# Patient Record
Sex: MALE | Race: WHITE | NOT HISPANIC OR LATINO | Employment: UNEMPLOYED | URBAN - METROPOLITAN AREA
[De-identification: names, ages, dates, MRNs, and addresses within clinical notes are randomized per-mention and may not be internally consistent; named-entity substitution may affect disease eponyms.]

---
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
## 2024-03-08 ENCOUNTER — OFFICE VISIT (OUTPATIENT)
Dept: URGENT CARE | Age: 19
End: 2024-03-08
Payer: COMMERCIAL

## 2024-03-08 VITALS — HEART RATE: 134 BPM | OXYGEN SATURATION: 99 % | TEMPERATURE: 97.5 F | RESPIRATION RATE: 18 BRPM

## 2024-03-08 DIAGNOSIS — L98.9 SKIN LESION, SUPERFICIAL: Primary | ICD-10-CM

## 2024-03-08 PROCEDURE — 99213 OFFICE O/P EST LOW 20 MIN: CPT | Performed by: PHYSICIAN ASSISTANT

## 2024-03-08 NOTE — PROGRESS NOTES
[facility]        NAME: Ishaan Sykes is a 19 y.o. male  : 2005    MRN: [id number]  DATE: 2024  TIME: 9:36 AM    Assessment and Plan   Skin lesion, superficial [L98.9]  1. Skin lesion, superficial              Patient Instructions       Follow up with PCP in 3-5 days.  Proceed to  ER if symptoms worsen.    If tests have been performed at [facility], our office will contact you with results if changes need to be made to the care plan discussed with you at the visit.  You can [facility] on [facility].    Chief Complaint     Chief Complaint   Patient presents with    Rash     Patient states that he noticed Tuesday that he has a small nodule on the penis. He denies pain with urination. He is very anxious at this time and is unsure where he got it. He believes it is an STI.          History of Present Illness       Patient here for evaluation of a skin lesion he noticed on the shaft of the penis on Tuesday.  Patient was sexually active  night into Monday.  He states he only has 1 partner with no known infections or lesions.  Patient is a risk a little bit of fluid in it and he did scratch it but denies any pain, residual swelling.  He states he noticed possible smaller lesion on the underside as well.    Rash        Review of Systems   Review of Systems   Constitutional: Negative.    Genitourinary:  Positive for genital sores. Negative for decreased urine volume, difficulty urinating, dysuria, enuresis, frequency, hematuria, penile discharge, penile pain, penile swelling, scrotal swelling, testicular pain and urgency.   Musculoskeletal: Negative.    Skin:  Positive for rash.         Current Medications       Current Outpatient Medications:     albuterol (PROVENTIL HFA,VENTOLIN HFA) 90 mcg/act inhaler, Inhale (Patient not taking: Reported on 2021 ), Disp: , Rfl:     dicyclomine (BENTYL) 10 mg capsule, Take 1 capsule (10 mg total) by mouth 4 (four) times a day (before  meals and at bedtime) for 5 days, Disp: 20 capsule, Rfl: 0    hydrocortisone 1 % cream, Apply topically 4 (four) times a day as needed for irritation (Patient not taking: Reported on 3/8/2024), Disp: 30 g, Rfl: 0    Current Allergies     Allergies as of 03/08/2024    (No Known Allergies)            The following portions of the patient's history were reviewed and updated as appropriate: allergies, current medications, past family history, past medical history, past social history, past surgical history and problem list.     Past Medical History:   Diagnosis Date    HL (hearing loss)        Past Surgical History:   Procedure Laterality Date    APPENDECTOMY         No family history on file.      Medications have been verified.        Objective   Pulse (!) 134   Temp 97.5 °F (36.4 °C)   Resp 18   SpO2 99%   No LMP for male patient.       Physical Exam     Physical Exam  Vitals and nursing note reviewed.   Constitutional:       General: He is not in acute distress.     Appearance: Normal appearance. He is well-developed. He is not ill-appearing, toxic-appearing or diaphoretic.   HENT:      Head: Normocephalic and atraumatic.   Genitourinary:     Penis: Circumcised. No erythema or discharge.       Testes: Normal.          Comments: Small 2 x 3 mm lesion which is flat and not ulcerated.  There is no discharge.  There is a small scabbed area where he scratched it.  No other lesions noted.  No fleshy raised lesions.  No other vesicles or swelling or erythema noted.  Skin:     General: Skin is warm and dry.   Neurological:      General: No focal deficit present.      Mental Status: He is alert and oriented to person, place, and time.   Psychiatric:         Mood and Affect: Mood normal.         Behavior: Behavior normal.         Thought Content: Thought content normal.         Judgment: Judgment normal.       The lesion does not have any characteristics of HPV.  There is no raised areas.  There is no fleshy colored  lesions.  There is no pain, ulceration or other vesicles consistent with herpetic lesions.  No evidence of STI on exam today.

## 2024-03-13 ENCOUNTER — OFFICE VISIT (OUTPATIENT)
Dept: URGENT CARE | Age: 19
End: 2024-03-13
Payer: COMMERCIAL

## 2024-03-13 VITALS
TEMPERATURE: 98.3 F | BODY MASS INDEX: 23.89 KG/M2 | HEART RATE: 82 BPM | RESPIRATION RATE: 18 BRPM | DIASTOLIC BLOOD PRESSURE: 60 MMHG | SYSTOLIC BLOOD PRESSURE: 122 MMHG | WEIGHT: 148 LBS

## 2024-03-13 DIAGNOSIS — J35.8 TONSIL STONE: Primary | ICD-10-CM

## 2024-03-13 PROCEDURE — 99213 OFFICE O/P EST LOW 20 MIN: CPT | Performed by: NURSE PRACTITIONER

## 2024-03-13 NOTE — PROGRESS NOTES
[facility]        NAME: Ishaan Sykes is a 19 y.o. male  : 2005    MRN: [id number]  DATE: 2024  TIME: 1:58 PM    Assessment and Plan   Tonsil stone [J35.8]  1. Tonsil stone              Patient Instructions     Tonsil stones  Use ibuprofen or Aleve OTC as needed for pain as needed  If pain worsens or symptoms do not resolve, follow-up with ENT  Follow up with PCP in 3-5 days.  Proceed to  ER if symptoms worsen.    If tests have been performed at [facility], our office will contact you with results if changes need to be made to the care plan discussed with you at the visit.  You can review your full results on [facility].    Chief Complaint     Chief Complaint   Patient presents with    Sore Throat     More irritation than pain. Started with symptoms on Monday .          History of Present Illness       HPI  Presents to clinic with discomfort in the throat. Reports that it started 2 days ago. That he looked in the throat this morning and saw some whitish things. Try to remove some of it but had to stop because of gag reflex.    Review of Systems   Review of Systems   Constitutional:  Negative for fever.   HENT:  Positive for sore throat. Negative for congestion, rhinorrhea and trouble swallowing.    Respiratory:  Negative for cough.    Cardiovascular:  Negative for chest pain.   Gastrointestinal:  Negative for diarrhea and vomiting.         Current Medications       Current Outpatient Medications:     albuterol (PROVENTIL HFA,VENTOLIN HFA) 90 mcg/act inhaler, Inhale (Patient not taking: Reported on 2021 ), Disp: , Rfl:     dicyclomine (BENTYL) 10 mg capsule, Take 1 capsule (10 mg total) by mouth 4 (four) times a day (before meals and at bedtime) for 5 days, Disp: 20 capsule, Rfl: 0    hydrocortisone 1 % cream, Apply topically 4 (four) times a day as needed for irritation (Patient not taking: Reported on 3/8/2024), Disp: 30 g, Rfl: 0    Current Allergies     Allergies as of  03/13/2024    (No Known Allergies)            The following portions of the patient's history were reviewed and updated as appropriate: allergies, current medications, past family history, past medical history, past social history, past surgical history and problem list.     Past Medical History:   Diagnosis Date    HL (hearing loss)        Past Surgical History:   Procedure Laterality Date    APPENDECTOMY         No family history on file.      Medications have been verified.        Objective   /60   Pulse 82   Temp 98.3 °F (36.8 °C) (Temporal)   Resp 18   Wt 67.1 kg (148 lb)   BMI 23.89 kg/m²   No LMP for male patient.       Physical Exam     Physical Exam  HENT:      Right Ear: Tympanic membrane normal.      Left Ear: Tympanic membrane normal.      Nose: No rhinorrhea.      Mouth/Throat:      Pharynx: Posterior oropharyngeal erythema (mild) present.      Tonsils: No tonsillar exudate. 1+ on the right. 1+ on the left.      Comments: Several small tonsil stones, L > R  Cardiovascular:      Rate and Rhythm: Regular rhythm.   Pulmonary:      Breath sounds: Normal breath sounds.

## 2024-04-01 ENCOUNTER — APPOINTMENT (OUTPATIENT)
Dept: URGENT CARE | Age: 19
End: 2024-04-01

## 2024-06-03 ENCOUNTER — OFFICE VISIT (OUTPATIENT)
Dept: URGENT CARE | Age: 19
End: 2024-06-03
Payer: COMMERCIAL

## 2024-06-03 VITALS
RESPIRATION RATE: 20 BRPM | DIASTOLIC BLOOD PRESSURE: 82 MMHG | OXYGEN SATURATION: 100 % | SYSTOLIC BLOOD PRESSURE: 136 MMHG | TEMPERATURE: 96.5 F | HEART RATE: 78 BPM

## 2024-06-03 DIAGNOSIS — L24.7 IRRITANT CONTACT DERMATITIS DUE TO PLANTS, EXCEPT FOOD: Primary | ICD-10-CM

## 2024-06-03 PROCEDURE — 99213 OFFICE O/P EST LOW 20 MIN: CPT

## 2024-06-03 RX ORDER — PREDNISONE 10 MG/1
TABLET ORAL
Qty: 40 TABLET | Refills: 0 | Status: SHIPPED | OUTPATIENT
Start: 2024-06-03

## 2024-06-03 NOTE — PROGRESS NOTES
[facility]        NAME: Ishaan Sykes is a 19 y.o. male  : 2005    MRN: [id number]  DATE: Cinthia 3, 2024  TIME: 1:26 PM    Assessment and Plan   Irritant contact dermatitis due to plants, except food [L24.7]  1. Irritant contact dermatitis due to plants, except food  predniSONE 10 mg tablet      Prednisone taper as directed.  Do not take additional motrin/ibuprofen/advil while on the steroids.  You may take additional tylenol as needed for pain.   Consider use of poison Ivy washes including Zanfel.    Good hand hygiene  Avoid scratching area  May take oatmeal bath  Keep area clean and dry  Cool compresses  Oral benadryl for itching as needed at night, it can make you drowsy  Watch for signs of infection    If you develop any increased redness, rash is spreading, facial swelling, shortness of breath, difficulty breathing, fever, any new or concerning symptoms please return or proceed ER.       Patient Instructions       Follow up with PCP in 3-5 days.  Proceed to  ER if symptoms worsen.    If tests have been performed at [facility], our office will contact you with results if changes need to be made to the care plan discussed with you at the visit.  You can review your full results on [facility].    Chief Complaint     Chief Complaint   Patient presents with   • Rash     Rash on bilateral arms, and abdomen, neck and chest since Friday         History of Present Illness       Pt is a 19 year old male presenting with rash to bilateral arms, chest, and neck.  He states he works as a  and was most likely exposed to poison ivy.  He as tried calamine lotion and rubbing alcohol.  He denies fever or chills.      Rash        Review of Systems   Review of Systems   Skin:  Positive for rash.         Current Medications       Current Outpatient Medications:   •  predniSONE 10 mg tablet, 4 tabs day 1-3, 3 tabs day 4-6, 2 tabs day 7-9, 1 tab day 10-12, Disp: 40 tablet, Rfl: 0    Current Allergies      Allergies as of 06/03/2024   • (No Known Allergies)            The following portions of the patient's history were reviewed and updated as appropriate: allergies, current medications, past family history, past medical history, past social history, past surgical history and problem list.     Past Medical History:   Diagnosis Date   • HL (hearing loss)        Past Surgical History:   Procedure Laterality Date   • APPENDECTOMY         History reviewed. No pertinent family history.      Medications have been verified.        Objective   /82   Pulse 78   Temp (!) 96.5 °F (35.8 °C) (Temporal)   Resp 20   SpO2 100%   No LMP for male patient.       Physical Exam     Physical Exam  Vitals and nursing note reviewed.   Constitutional:       General: He is not in acute distress.     Appearance: Normal appearance. He is normal weight.   Cardiovascular:      Rate and Rhythm: Normal rate.   Pulmonary:      Effort: Pulmonary effort is normal. No respiratory distress.      Breath sounds: Normal breath sounds. No stridor. No wheezing, rhonchi or rales.   Chest:      Chest wall: No tenderness.   Skin:     General: Skin is warm.      Findings: Rash present. Rash is macular.      Comments: Bilateral forearms, hands, chest, neck.     Neurological:      Mental Status: He is alert.

## 2024-06-03 NOTE — PATIENT INSTRUCTIONS
Prednisone taper as directed.  Do not take additional motrin/ibuprofen/advil while on the steroids.  You may take additional tylenol as needed for pain.    Consider use of poison Ivy washes including Zanfel.    Good hand hygiene  Avoid scratching area  May take oatmeal bath  Keep area clean and dry  Cool compresses  Oral benadryl for itching as needed at night, it can make you drowsy  Watch for signs of infection    If you develop any increased redness, rash is spreading, facial swelling, shortness of breath, difficulty breathing, fever, any new or concerning symptoms please return or proceed ER.

## 2025-05-01 ENCOUNTER — APPOINTMENT (OUTPATIENT)
Dept: URGENT CARE | Age: 20
End: 2025-05-01

## 2025-05-01 ENCOUNTER — APPOINTMENT (OUTPATIENT)
Dept: PHYSICAL THERAPY | Age: 20
End: 2025-05-01

## 2025-05-01 PROCEDURE — 97530 THERAPEUTIC ACTIVITIES: CPT
